# Patient Record
Sex: FEMALE | Race: WHITE | Employment: PART TIME | URBAN - METROPOLITAN AREA
[De-identification: names, ages, dates, MRNs, and addresses within clinical notes are randomized per-mention and may not be internally consistent; named-entity substitution may affect disease eponyms.]

---

## 2019-05-13 ENCOUNTER — HOSPITAL ENCOUNTER (OUTPATIENT)
Dept: CT IMAGING | Age: 59
Discharge: HOME | End: 2019-05-13
Payer: COMMERCIAL

## 2019-05-13 DIAGNOSIS — R51.9 NEW ONSET HEADACHE: ICD-10-CM

## 2019-05-13 PROCEDURE — 70450 CT HEAD/BRAIN W/O DYE: CPT

## 2019-11-07 ENCOUNTER — HOSPITAL ENCOUNTER (OUTPATIENT)
Dept: WOMENS IMAGING | Age: 59
Discharge: HOME | End: 2019-11-07
Payer: COMMERCIAL

## 2019-11-07 ENCOUNTER — HOSPITAL ENCOUNTER (OUTPATIENT)
Dept: ULTRASOUND IMAGING | Age: 59
Discharge: HOME | End: 2019-11-07
Payer: COMMERCIAL

## 2019-11-07 PROCEDURE — 76642 ULTRASOUND BREAST LIMITED: CPT

## 2020-08-06 DIAGNOSIS — Z00.00 ANNUAL PHYSICAL EXAM: ICD-10-CM

## 2020-08-06 LAB
A/G RATIO: 2 (ref 1.1–2.2)
ALBUMIN SERPL-MCNC: 4.5 G/DL (ref 3.4–5)
ALP BLD-CCNC: 82 U/L (ref 40–129)
ALT SERPL-CCNC: 9 U/L (ref 10–40)
ANION GAP SERPL CALCULATED.3IONS-SCNC: 12 MMOL/L (ref 3–16)
AST SERPL-CCNC: 15 U/L (ref 15–37)
BASOPHILS ABSOLUTE: 0 K/UL (ref 0–0.2)
BASOPHILS RELATIVE PERCENT: 0.5 %
BILIRUB SERPL-MCNC: 0.8 MG/DL (ref 0–1)
BUN BLDV-MCNC: 14 MG/DL (ref 7–20)
CALCIUM SERPL-MCNC: 9.9 MG/DL (ref 8.3–10.6)
CHLORIDE BLD-SCNC: 104 MMOL/L (ref 99–110)
CHOLESTEROL, TOTAL: 202 MG/DL (ref 0–199)
CO2: 26 MMOL/L (ref 21–32)
CREAT SERPL-MCNC: 0.7 MG/DL (ref 0.6–1.1)
EOSINOPHILS ABSOLUTE: 0.1 K/UL (ref 0–0.6)
EOSINOPHILS RELATIVE PERCENT: 1.5 %
GFR AFRICAN AMERICAN: >60
GFR NON-AFRICAN AMERICAN: >60
GLOBULIN: 2.2 G/DL
GLUCOSE BLD-MCNC: 120 MG/DL (ref 70–99)
HCT VFR BLD CALC: 41.9 % (ref 36–48)
HDLC SERPL-MCNC: 68 MG/DL (ref 40–60)
HEMOGLOBIN: 14.1 G/DL (ref 12–16)
LDL CHOLESTEROL CALCULATED: 110 MG/DL
LYMPHOCYTES ABSOLUTE: 1.8 K/UL (ref 1–5.1)
LYMPHOCYTES RELATIVE PERCENT: 25.9 %
MCH RBC QN AUTO: 30.1 PG (ref 26–34)
MCHC RBC AUTO-ENTMCNC: 33.6 G/DL (ref 31–36)
MCV RBC AUTO: 89.5 FL (ref 80–100)
MONOCYTES ABSOLUTE: 0.3 K/UL (ref 0–1.3)
MONOCYTES RELATIVE PERCENT: 4.5 %
NEUTROPHILS ABSOLUTE: 4.6 K/UL (ref 1.7–7.7)
NEUTROPHILS RELATIVE PERCENT: 67.6 %
PDW BLD-RTO: 13.6 % (ref 12.4–15.4)
PLATELET # BLD: 195 K/UL (ref 135–450)
PMV BLD AUTO: 8 FL (ref 5–10.5)
POTASSIUM SERPL-SCNC: 4.1 MMOL/L (ref 3.5–5.1)
RBC # BLD: 4.69 M/UL (ref 4–5.2)
SODIUM BLD-SCNC: 142 MMOL/L (ref 136–145)
TOTAL PROTEIN: 6.7 G/DL (ref 6.4–8.2)
TRIGL SERPL-MCNC: 118 MG/DL (ref 0–150)
TSH REFLEX FT4: 1.39 UIU/ML (ref 0.27–4.2)
VITAMIN D 25-HYDROXY: 37 NG/ML
VLDLC SERPL CALC-MCNC: 24 MG/DL
WBC # BLD: 6.8 K/UL (ref 4–11)

## 2020-08-07 LAB
ESTIMATED AVERAGE GLUCOSE: 114 MG/DL
HBA1C MFR BLD: 5.6 %

## 2020-08-31 ENCOUNTER — HOSPITAL ENCOUNTER (OUTPATIENT)
Age: 60
Discharge: HOME | End: 2020-08-31
Payer: COMMERCIAL

## 2020-08-31 ENCOUNTER — HOSPITAL ENCOUNTER (OUTPATIENT)
Dept: GENERAL RADIOLOGY | Age: 60
Discharge: HOME | End: 2020-08-31
Payer: COMMERCIAL

## 2020-08-31 PROCEDURE — 73502 X-RAY EXAM HIP UNI 2-3 VIEWS: CPT

## 2020-09-30 ENCOUNTER — HOSPITAL ENCOUNTER (OUTPATIENT)
Dept: WOMENS IMAGING | Age: 60
Discharge: HOME | End: 2020-09-30
Payer: COMMERCIAL

## 2020-09-30 PROCEDURE — 77080 DXA BONE DENSITY AXIAL: CPT

## 2020-09-30 PROCEDURE — 77063 BREAST TOMOSYNTHESIS BI: CPT

## 2020-12-24 ENCOUNTER — OFFICE VISIT (OUTPATIENT)
Dept: PRIMARY CARE CLINIC | Age: 60
End: 2020-12-24
Payer: COMMERCIAL

## 2020-12-24 PROCEDURE — 99211 OFF/OP EST MAY X REQ PHY/QHP: CPT | Performed by: NURSE PRACTITIONER

## 2020-12-25 LAB — SARS-COV-2: DETECTED

## 2021-11-17 ENCOUNTER — HOSPITAL ENCOUNTER (OUTPATIENT)
Dept: WOMENS IMAGING | Age: 61
Discharge: HOME | End: 2021-11-17
Payer: COMMERCIAL

## 2021-11-17 DIAGNOSIS — Z12.31 VISIT FOR SCREENING MAMMOGRAM: ICD-10-CM

## 2021-11-17 PROCEDURE — 77063 BREAST TOMOSYNTHESIS BI: CPT

## 2022-07-13 NOTE — PROGRESS NOTES
[facility] ENDOSCOPY COLONOSCOPY PRE-OPERATIVE INSTRUCTIONS    Procedure date_07/15/22________  Arrival time_1200___________          Surgery time__1300__________       Clear liquids the day before the procedure. Do not eat or drink anything within 5 hours of your procedure. This includes water chewing gum, mints and ice chips. You may brush your teeth and gargle the morning of your surgery, but do not swallow the water    You may be asked to stop blood thinners such as Coumadin, Plavix, Fragmin, Lovenox, etc., or any anti-inflammatories such as:  Aspirin, Ibuprofen, Advil, Naproxen prior to your procedure. We also ask that you stop any OTC medications such as fish oil, vitamin E, glucosamine, garlic, Multivitamins, COQ 10, etc.    You must make arrangements for a responsible adult to arrive with you and stay in our waiting area during your procedure. They will also need to take you home after your procedure. For your safety you will not be allowed to leave alone or drive yourself home. Also for your safety, it is strongly suggested that someone stay with you the first 24 hours after your procedure. For your comfort, please wear simple loose fitting clothing to the center. Please do not bring valuables. If you have a living will and a durable power of  for healthcare, please bring in a copy.      You will need to bring a photo ID and insurance card    Our goal is to provide you with excellent care so if you have any questions, please contact us at the [facility] at [phone]         Please note these are generalized instructions for all colonoscopy cases, you may be provided with more specific instructions if necessary

## 2022-07-14 DIAGNOSIS — R73.01 ELEVATED FASTING GLUCOSE: ICD-10-CM

## 2022-07-14 LAB
ESTIMATED AVERAGE GLUCOSE: 125.5 MG/DL
HBA1C MFR BLD: 6 %

## 2022-07-15 ENCOUNTER — ANESTHESIA (OUTPATIENT)
Dept: ENDOSCOPY | Age: 62
End: 2022-07-15
Payer: COMMERCIAL

## 2022-07-15 ENCOUNTER — HOSPITAL ENCOUNTER (OUTPATIENT)
Age: 62
Setting detail: OUTPATIENT SURGERY
Discharge: HOME | End: 2022-07-15
Attending: INTERNAL MEDICINE | Admitting: INTERNAL MEDICINE
Payer: COMMERCIAL

## 2022-07-15 ENCOUNTER — ANESTHESIA EVENT (OUTPATIENT)
Dept: ENDOSCOPY | Age: 62
End: 2022-07-15
Payer: COMMERCIAL

## 2022-07-15 VITALS
DIASTOLIC BLOOD PRESSURE: 69 MMHG | HEIGHT: 64 IN | SYSTOLIC BLOOD PRESSURE: 138 MMHG | BODY MASS INDEX: 29.19 KG/M2 | WEIGHT: 171 LBS | OXYGEN SATURATION: 100 % | RESPIRATION RATE: 16 BRPM | HEART RATE: 70 BPM | TEMPERATURE: 97.3 F

## 2022-07-15 PROCEDURE — 2580000003 HC RX 258: Performed by: STUDENT IN AN ORGANIZED HEALTH CARE EDUCATION/TRAINING PROGRAM

## 2022-07-15 PROCEDURE — 2500000003 HC RX 250 WO HCPCS: Performed by: NURSE ANESTHETIST, CERTIFIED REGISTERED

## 2022-07-15 PROCEDURE — 7100000011 HC PHASE II RECOVERY - ADDTL 15 MIN: Performed by: INTERNAL MEDICINE

## 2022-07-15 PROCEDURE — 6360000002 HC RX W HCPCS: Performed by: NURSE ANESTHETIST, CERTIFIED REGISTERED

## 2022-07-15 PROCEDURE — 3609027000 HC COLONOSCOPY: Performed by: INTERNAL MEDICINE

## 2022-07-15 PROCEDURE — 2580000003 HC RX 258: Performed by: NURSE ANESTHETIST, CERTIFIED REGISTERED

## 2022-07-15 PROCEDURE — 3700000000 HC ANESTHESIA ATTENDED CARE: Performed by: INTERNAL MEDICINE

## 2022-07-15 PROCEDURE — 7100000010 HC PHASE II RECOVERY - FIRST 15 MIN: Performed by: INTERNAL MEDICINE

## 2022-07-15 PROCEDURE — 3700000001 HC ADD 15 MINUTES (ANESTHESIA): Performed by: INTERNAL MEDICINE

## 2022-07-15 RX ORDER — PROPOFOL 10 MG/ML
INJECTION, EMULSION INTRAVENOUS PRN
Status: DISCONTINUED | OUTPATIENT
Start: 2022-07-15 | End: 2022-07-15 | Stop reason: SDUPTHER

## 2022-07-15 RX ORDER — SODIUM CHLORIDE 9 MG/ML
INJECTION, SOLUTION INTRAVENOUS PRN
Status: DISCONTINUED | OUTPATIENT
Start: 2022-07-15 | End: 2022-07-15 | Stop reason: HOSPADM

## 2022-07-15 RX ORDER — SODIUM CHLORIDE 9 MG/ML
INJECTION, SOLUTION INTRAVENOUS CONTINUOUS
Status: DISCONTINUED | OUTPATIENT
Start: 2022-07-15 | End: 2022-07-15 | Stop reason: HOSPADM

## 2022-07-15 RX ORDER — SODIUM CHLORIDE 0.9 % (FLUSH) 0.9 %
5-40 SYRINGE (ML) INJECTION PRN
Status: DISCONTINUED | OUTPATIENT
Start: 2022-07-15 | End: 2022-07-15 | Stop reason: HOSPADM

## 2022-07-15 RX ORDER — SODIUM CHLORIDE 0.9 % (FLUSH) 0.9 %
5-40 SYRINGE (ML) INJECTION EVERY 12 HOURS SCHEDULED
Status: DISCONTINUED | OUTPATIENT
Start: 2022-07-15 | End: 2022-07-15 | Stop reason: HOSPADM

## 2022-07-15 RX ORDER — SODIUM CHLORIDE 9 MG/ML
INJECTION, SOLUTION INTRAVENOUS CONTINUOUS PRN
Status: DISCONTINUED | OUTPATIENT
Start: 2022-07-15 | End: 2022-07-15 | Stop reason: SDUPTHER

## 2022-07-15 RX ORDER — LIDOCAINE HYDROCHLORIDE 20 MG/ML
INJECTION, SOLUTION EPIDURAL; INFILTRATION; INTRACAUDAL; PERINEURAL PRN
Status: DISCONTINUED | OUTPATIENT
Start: 2022-07-15 | End: 2022-07-15 | Stop reason: SDUPTHER

## 2022-07-15 RX ADMIN — SODIUM CHLORIDE: 9 INJECTION, SOLUTION INTRAVENOUS at 13:17

## 2022-07-15 RX ADMIN — SODIUM CHLORIDE: 9 INJECTION, SOLUTION INTRAVENOUS at 12:21

## 2022-07-15 RX ADMIN — PROPOFOL 50 MG: 10 INJECTION, EMULSION INTRAVENOUS at 13:23

## 2022-07-15 RX ADMIN — LIDOCAINE HYDROCHLORIDE 40 MG: 20 INJECTION, SOLUTION EPIDURAL; INFILTRATION; INTRACAUDAL; PERINEURAL at 13:20

## 2022-07-15 RX ADMIN — PROPOFOL 50 MG: 10 INJECTION, EMULSION INTRAVENOUS at 13:28

## 2022-07-15 RX ADMIN — PROPOFOL 50 MG: 10 INJECTION, EMULSION INTRAVENOUS at 13:34

## 2022-07-15 RX ADMIN — PROPOFOL 100 MG: 10 INJECTION, EMULSION INTRAVENOUS at 13:20

## 2022-07-15 ASSESSMENT — PAIN - FUNCTIONAL ASSESSMENT: PAIN_FUNCTIONAL_ASSESSMENT: NONE - DENIES PAIN

## 2022-07-15 ASSESSMENT — ENCOUNTER SYMPTOMS: SHORTNESS OF BREATH: 0

## 2022-07-15 NOTE — OP NOTE
Operative Note      Patient: Kat Thornton  YOB: 1960  MRN: [id number]    Date of Procedure: 7/15/2022    Pre-Op Diagnosis: History of colon polyps, Family history of colon cancer    Post-Op Diagnosis: Same       Procedure(s):  COLONOSCOPY DIAGNOSTIC    Surgeon(s):  Radha Fontanez MD    Assistant:   * No surgical staff found *    Anesthesia: Monitor Anesthesia Care    Estimated Blood Loss (mL): None    Complications: None    Specimens:   * No specimens in log *    Implants:  * No implants in log *      Drains: * No LDAs found *    Findings: See dictated report    Detailed Description of Procedure:   See dictated report    Electronically signed by Radha Fontanez MD on 7/15/2022 at 1:44 PM

## 2022-07-15 NOTE — BRIEF OP NOTE
Brief Postoperative Note    Odalis Lau  YOB: 1960  [id number]      Pre-operative Diagnosis: Polyp surveillance; family history of colon cancer    Previous Colonoscopy: Yes  When: 06/27/16  Greater than 3 years? Yes      Post-operative Diagnosis: Same    Procedure: Colonoscopy    The preparation was good.     Anesthesia: MAC    Surgeons/Assistants: Yvon Alston MD    Estimated Blood Loss: None    Complications: None    Specimens: Was Not Obtained    Findings: See dictated report    Electronically signed by Yvon Alston MD on 7/10/2017 at 7:45 AM

## 2022-07-15 NOTE — DISCHARGE INSTRUCTIONS
[facility] Endoscopy M[facility] Discharge Instructions  Colonoscopy    NAME:  Omid Madrigal  YOB: 1960  MEDICAL RECORD NUMBER:  [id number]  DATE:  7/15/2022      After receiving Propofol (Diprivan) for Moderate Sedation:    Do not drive or operate any machinery until tomorrow  Do not sign any legal documents or make any critical decisions  Do not drink alcoholic beverages for 24 hours  Plan to spend a few hours resting before resuming your normal routine  Possible side effects are light headedness and sedation    You may resume your usual diet at home    Resume all your daily medications    Call your physician if any of the following occur:    Severe abdominal distention and/or pain. (Mild distention or cramping is normal after this procedure; this should improve within an hour or two with passage of air)  Fever, chills, nausea or vomiting  You may notice a small amount of blood in your next few bowel movements    If excessive bleeding occurs:  Call your physician immediately or proceed to the nearest Emergency Room    Biopsy Obtained: NO      Recommendations: Repeat colonoscopy in 5 years         For questions or concerns please contact your GI physician's 24 hour call center at [phone].

## 2022-07-15 NOTE — ANESTHESIA PRE PROCEDURE
Department of Anesthesiology  Preprocedure Note       Name:  Dimas Maldonado   Age:  65 y.o.  :  1960                                          MRN:  [id number]         Date:  7/15/2022      Surgeon: Jimi Peraza):  Patricio Terry MD    Procedure: Procedure(s):  COLONOSCOPY DIAGNOSTIC    Medications prior to admission:   Prior to Admission medications    Medication Sig Start Date End Date Taking? Authorizing Provider   SUPREP BOWEL PREP KIT 17.5-3.13-1.6 GM/177ML SOLN solution TAKE 1 ML BY MOUTH 1 TIME 22   Historical Provider, MD   mirabegron (MYRBETRIQ) 25 MG TB24 Take 1 tablet by mouth daily    Historical Provider, MD   Multiple Vitamins-Minerals (THERAPEUTIC MULTIVITAMIN-MINERALS) tablet Take 1 tablet by mouth daily    Historical Provider, MD       Current medications:    Current Facility-Administered Medications   Medication Dose Route Frequency Provider Last Rate Last Admin    0.9 % sodium chloride infusion   IntraVENous Continuous Michael Barry  mL/hr at 07/15/22 1221 New Bag at 07/15/22 1221    sodium chloride flush 0.9 % injection 5-40 mL  5-40 mL IntraVENous 2 times per day Michael Barry MD        sodium chloride flush 0.9 % injection 5-40 mL  5-40 mL IntraVENous PRN Michael Barry MD        0.9 % sodium chloride infusion   IntraVENous PRN Michael Barry MD           Allergies: Allergies   Allergen Reactions    Penicillins Other (See Comments)     \"throat closes\"       Problem List:  There is no problem list on file for this patient. Past Medical History:  History reviewed. No pertinent past medical history. Past Surgical History:        Procedure Laterality Date    BREAST LUMPECTOMY Right     COLONOSCOPY  2016    Dr. Beni Walton         Social History:    Social History     Tobacco Use    Smoking status: Never    Smokeless tobacco: Never   Substance Use Topics    Alcohol use:  Yes     Alcohol/week: 5.0 standard drinks     Types: 5 Cans of beer per week     Comment: socially                                Counseling given: Not Answered      Vital Signs (Current):   Vitals:    07/13/22 1012 07/15/22 1212   BP:  (!) 153/94   Pulse:  79   Resp:  17   Temp:  97.8 °F (36.6 °C)   TempSrc:  Temporal   SpO2:  99%   Weight: 177 lb (80.3 kg) 171 lb (77.6 kg)   Height: 5' 3.5\" (1.613 m) 5' 3.5\" (1.613 m)                                              BP Readings from Last 3 Encounters:   07/15/22 (!) 153/94   07/13/22 116/74   12/23/20 124/76       NPO Status: Time of last liquid consumption: 2230                        Time of last solid consumption: 1800                        Date of last liquid consumption: 07/14/22                        Date of last solid food consumption: 07/13/22    BMI:   Wt Readings from Last 3 Encounters:   07/15/22 171 lb (77.6 kg)   07/13/22 177 lb 12.8 oz (80.6 kg)   12/23/20 174 lb (78.9 kg)     Body mass index is 29.82 kg/m². CBC:   Lab Results   Component Value Date/Time    WBC 5.5 07/13/2022 09:44 AM    RBC 4.62 07/13/2022 09:44 AM    HGB 13.9 07/13/2022 09:44 AM    HCT 41.0 07/13/2022 09:44 AM    MCV 88.8 07/13/2022 09:44 AM    RDW 13.5 07/13/2022 09:44 AM     07/13/2022 09:44 AM       CMP:   Lab Results   Component Value Date/Time     07/13/2022 09:44 AM    K 4.3 07/13/2022 09:44 AM     07/13/2022 09:44 AM    CO2 26 07/13/2022 09:44 AM    BUN 13 07/13/2022 09:44 AM    CREATININE 0.7 07/13/2022 09:44 AM    GFRAA >60 07/13/2022 09:44 AM    AGRATIO 2.4 07/13/2022 09:44 AM    LABGLOM >60 07/13/2022 09:44 AM    GLUCOSE 132 07/13/2022 09:44 AM    PROT 6.8 07/13/2022 09:44 AM    CALCIUM 9.5 07/13/2022 09:44 AM    BILITOT 0.8 07/13/2022 09:44 AM    ALKPHOS 86 07/13/2022 09:44 AM    AST 17 07/13/2022 09:44 AM    ALT 13 07/13/2022 09:44 AM       POC Tests: No results for input(s): POCGLU, POCNA, POCK, POCCL, POCBUN, POCHEMO, POCHCT in the last 72 hours.     Coags: No results found for: PROTIME, INR, APTT    HCG (If Applicable):   Lab Results   Component Value Date    PREGTESTUR Negative 06/27/2016        ABGs: No results found for: PHART, PO2ART, SAZ3XGN, NTK6MNM, BEART, Y3RJRPOV     Type & Screen (If Applicable):  No results found for: LABABO, LABRH    Drug/Infectious Status (If Applicable):  No results found for: HIV, HEPCAB    COVID-19 Screening (If Applicable):   Lab Results   Component Value Date/Time    COVID19 Detected 12/24/2020 08:47 AM           Anesthesia Evaluation  Patient summary reviewed and Nursing notes reviewed no history of anesthetic complications:   Airway: Mallampati: I  TM distance: >3 FB   Neck ROM: full  Mouth opening: > = 3 FB   Dental: normal exam         Pulmonary: breath sounds clear to auscultation      (-) pneumonia, asthma, shortness of breath, recent URI and sleep apnea                           Cardiovascular:  Exercise tolerance: good (>4 METS),       (-) hypertension, valvular problems/murmurs, past MI, CAD, CABG/stent, dysrhythmias,  angina,  FAIR and no pulmonary hypertension      Rhythm: regular  Rate: normal                    Neuro/Psych:      (-) seizures, TIA and CVA           GI/Hepatic/Renal:   (+) bowel prep,      (-) liver disease and no renal disease       Endo/Other:        (-) diabetes mellitus, hypothyroidism               Abdominal:             Vascular:     - PVD, DVT and PE. Other Findings:           Anesthesia Plan      MAC     ASA 1     (Discussed risks and benefits to sedation including nausea, vomiting, allergic reaction, headache, delayed cognitive recovery, stroke, heart attack, respiratory depression, and death which patient understood and agreed to proceed. The patient was given the opportunity to ask questions and all questions were answered to the patient's satisfaction.  )  Induction: intravenous. Anesthetic plan and risks discussed with patient. Plan discussed with CRNA.                   This pre-anesthesia assessment may be used as a history and physical.    DOS STAFF ADDENDUM:    Pt seen and examined, chart reviewed (including anesthesia, drug and allergy history). No interval changes to history and physical examination. Anesthetic plan, risks, benefits, alternatives, and personnel involved discussed with patient. Patient verbalized an understanding and agrees to proceed.       Bro Cheek MD  July 15, 2022  12:32 PM

## 2022-07-15 NOTE — ANESTHESIA POSTPROCEDURE EVALUATION
Department of Anesthesiology  Postprocedure Note    Patient: Joaquina Lynch  MRN: [id number]  YOB: 1960  Date of evaluation: 7/15/2022      Procedure Summary     Date: 07/15/22 Room / Location: [address] [place] [address]    Anesthesia Start: 5642 Anesthesia Stop: 1343    Procedure: COLONOSCOPY DIAGNOSTIC Diagnosis:       History of colon polyps      Family history of colon cancer      (History of colon polyps, Family history of colon cancer)    Surgeons: Isabella Iqbal MD Responsible Provider: Felice Gutiérrez MD    Anesthesia Type: MAC ASA Status: 1          Anesthesia Type: No value filed.     Lorenza Phase I: Lorenza Score: 10    Lorenza Phase II: Lorenza Score: 10      Anesthesia Post Evaluation    Patient location during evaluation: PACU  Patient participation: complete - patient participated  Level of consciousness: awake  Airway patency: patent  Nausea & Vomiting: no nausea and no vomiting  Cardiovascular status: blood pressure returned to baseline  Respiratory status: acceptable  Hydration status: stable  Multimodal analgesia pain management approach

## 2022-07-15 NOTE — H&P
[facility]   Pre-operative History and Physical    Patient: Liu Molina  : 1960  Acct#:         HISTORY OF PRESENT ILLNESS:    The patient is a 65 y.o. female  who presents with polyp surveillance; family history of colon cancer  Past Medical History:    History reviewed. No pertinent past medical history. Past Surgical History:        Procedure Laterality Date    BREAST LUMPECTOMY Right 2008    COLONOSCOPY  2016    Dr. Jacob Marcano       Medications prior to admission:   Prior to Admission medications    Medication Sig Start Date End Date Taking? Authorizing Provider   SUPREP BOWEL PREP KIT 17.5-3.13-1.6 GM/177ML SOLN solution TAKE 1 ML BY MOUTH 1 TIME 22   Historical Provider, MD   mirabegron (MYRBETRIQ) 25 MG TB24 Take 1 tablet by mouth daily    Historical Provider, MD   Multiple Vitamins-Minerals (THERAPEUTIC MULTIVITAMIN-MINERALS) tablet Take 1 tablet by mouth daily    Historical Provider, MD     Allergies:    Penicillins  Social History:   Social History     Socioeconomic History    Marital status:      Spouse name: Not on file    Number of children: Not on file    Years of education: Not on file    Highest education level: Not on file   Occupational History    Not on file   Tobacco Use    Smoking status: Never    Smokeless tobacco: Never   Vaping Use    Vaping Use: Never used   Substance and Sexual Activity    Alcohol use:  Yes     Alcohol/week: 5.0 standard drinks     Types: 5 Cans of beer per week     Comment: socially    Drug use: No    Sexual activity: Not on file   Other Topics Concern    Not on file   Social History Narrative    Not on file     Social Determinants of Health     Financial Resource Strain: Not on file   Food Insecurity: Not on file   Transportation Needs: Not on file   Physical Activity: Unknown    Days of Exercise per Week: Patient refused    Minutes of Exercise per Session: Not on file   Stress: Not on file   Social Connections: Not on file   Intimate Partner Violence: Not At Risk    Fear of Current or Ex-Partner: No    Emotionally Abused: No    Physically Abused: No    Sexually Abused: No   Housing Stability: Not on file           Family History:   Family History   Problem Relation Age of Onset    Dementia Mother 80    Cancer Father 46        colon    Cancer Brother 21        testicular cancer    Cancer Maternal Grandmother         breast ca         PHYSICAL EXAM:      BP (!) 153/94   Pulse 79   Temp 97.8 °F (36.6 °C) (Temporal)   Resp 17   Ht 5' 3.5\" (1.613 m)   Wt 171 lb (77.6 kg)   LMP 06/23/2016 (Exact Date)   SpO2 99%   BMI 29.82 kg/m²  I        Heart: Normal    Lungs: Normal    Abdomen: Normal      ASA Grade: ASA 1 - Normal health patient    I (soft palate, uvula, fauces, tonsillar pillars visible)  ASSESSMENT AND PLAN:    1. Patient is a 65 y.o. female here for Colonoscopy  2. Procedure options, risks and benefits reviewed with patient who expresses understanding.

## 2022-07-16 NOTE — PROCEDURES
[address] [facility]                               COL[facility]OSCOPY REPORT    PATIENT NAME: Christina Chambers               :        1960  MED REC NO:   [id number]                          ROOM:  ACCOUNT NO:   [de-identified]                           ADMIT DATE: 07/15/2022  PROVIDER:     Mauricio Billings MD      DATE OF PROCEDURE:  07/15/2022    REFERRING PROVIDER:  Marthena Castleman, FNP    INSTRUMENT USED:  Olympus [id number]-H190DL. ANESTHESIA:  The patient was premedicated with Diprivan intravenously as  administered by the anesthesiology service. INDICATIONS:  The patient has a history of colonic polyps as well as a  family history of colon cancer. PROCEDURE:  The digital and anal exams were normal.  The colonoscope was  inserted to the cecum. The prep overall was good. No mucosal lesions  were identified. ESTIMATED BLOOD LOSS:  None. IMPRESSION:  Normal colonoscopy. PLAN:  The patient should undergo a surveillance colonoscopy in 5 years. HANNAH Mclaughlin MD    D: 07/15/2022 14:00:44       T: 07/15/2022 14:04:21     [id number]  Job#: [id number]     Doc#: [id number]    CC:  Mauricio Baum MD

## 2023-03-15 ENCOUNTER — HOSPITAL ENCOUNTER (OUTPATIENT)
Dept: WOMENS IMAGING | Age: 63
Discharge: HOME | End: 2023-03-15
Payer: COMMERCIAL

## 2023-03-15 DIAGNOSIS — Z12.31 VISIT FOR SCREENING MAMMOGRAM: ICD-10-CM

## 2023-03-15 PROCEDURE — 77063 BREAST TOMOSYNTHESIS BI: CPT

## 2024-03-18 ENCOUNTER — HOSPITAL ENCOUNTER (OUTPATIENT)
Dept: WOMENS IMAGING | Age: 64
Discharge: HOME | End: 2024-03-18
Payer: COMMERCIAL

## 2024-03-18 DIAGNOSIS — Z12.31 SCREENING MAMMOGRAM FOR BREAST CANCER: ICD-10-CM

## 2024-03-18 PROCEDURE — 77067 SCR MAMMO BI INCL CAD: CPT

## 2025-03-18 ENCOUNTER — HOSPITAL ENCOUNTER (OUTPATIENT)
Dept: WOMENS IMAGING | Age: 65
Discharge: HOME | End: 2025-03-18
Payer: COMMERCIAL

## 2025-03-18 VITALS — BODY MASS INDEX: 30.39 KG/M2 | WEIGHT: 178 LBS | HEIGHT: 64 IN

## 2025-03-18 DIAGNOSIS — Z12.31 BREAST CANCER SCREENING BY MAMMOGRAM: ICD-10-CM

## 2025-03-18 PROCEDURE — 77063 BREAST TOMOSYNTHESIS BI: CPT

## 2025-03-19 ENCOUNTER — RESULTS FOLLOW-UP (OUTPATIENT)
Dept: WOMENS IMAGING | Age: 65
End: 2025-03-19